# Patient Record
Sex: MALE | Race: WHITE
[De-identification: names, ages, dates, MRNs, and addresses within clinical notes are randomized per-mention and may not be internally consistent; named-entity substitution may affect disease eponyms.]

---

## 2020-10-27 LAB
ALBUMIN SERPL BCP-MCNC: 3.8 G/DL (ref 3.4–5)
ALBUMIN/GLOB SERPL: 1.1 {RATIO} (ref 1.1–1.5)
ALP SERPL-CCNC: 83 IU/L (ref 46–116)
ALT SERPL W P-5'-P-CCNC: 33 U/L (ref 30–65)
ANION GAP SERPL CALCULATED.3IONS-SCNC: 9 MMOL/L (ref 8–16)
AST SERPL W P-5'-P-CCNC: 11 U/L (ref 10–37)
BASOPHILS # BLD AUTO: 0 X10'3 (ref 0–0.2)
BASOPHILS NFR BLD AUTO: 0.6 % (ref 0–1)
BILIRUB SERPL-MCNC: 0.7 MG/DL (ref 0–1)
BUN SERPL-MCNC: 12 MG/DL (ref 7–18)
BUN/CREAT SERPL: 16 (ref 5.4–32)
CALCIUM SERPL-MCNC: 8.7 MG/DL (ref 8.5–10.1)
CHLORIDE SERPL-SCNC: 103 MMOL/L (ref 99–107)
CO2 SERPL-SCNC: 26.6 MMOL/L (ref 24–32)
CREAT SERPL-MCNC: 0.75 MG/DL (ref 0.6–1.1)
EOSINOPHIL # BLD AUTO: 0.1 X10'3 (ref 0–0.9)
EOSINOPHIL NFR BLD AUTO: 1.2 % (ref 0–6)
ERYTHROCYTE [DISTWIDTH] IN BLOOD BY AUTOMATED COUNT: 13.5 % (ref 11.5–14.5)
GFR SERPL CREATININE-BSD FRML MDRD: > 90 ML/MIN
GLUCOSE SERPL-MCNC: 155 MG/DL (ref 70–104)
HCT VFR BLD AUTO: 46.3 % (ref 42–52)
HGB BLD-MCNC: 15.6 G/DL (ref 14–17.9)
LYMPHOCYTES # BLD AUTO: 1.8 X10'3 (ref 1.1–4.8)
LYMPHOCYTES NFR BLD AUTO: 27.6 % (ref 21–51)
MCH RBC QN AUTO: 31.5 PG (ref 27–31)
MCHC RBC AUTO-ENTMCNC: 33.8 G/DL (ref 33–36.5)
MCV RBC AUTO: 93.3 FL (ref 78–98)
MONOCYTES # BLD AUTO: 0.5 X10'3 (ref 0–0.9)
MONOCYTES NFR BLD AUTO: 7.6 % (ref 2–12)
NEUTROPHILS # BLD AUTO: 4.2 X10'3 (ref 1.8–7.7)
NEUTROPHILS NFR BLD AUTO: 63 % (ref 42–75)
PLATELET # BLD AUTO: 156 X10'3 (ref 140–440)
PMV BLD AUTO: 9.3 FL (ref 7.4–10.4)
POTASSIUM SERPL-SCNC: 3.8 MMOL/L (ref 3.4–5.1)
PROT SERPL-MCNC: 7.3 G/DL (ref 6.4–8.2)
RBC # BLD AUTO: 4.96 X10'6 (ref 4.7–6.1)
SODIUM SERPL-SCNC: 139 MMOL/L (ref 135–145)

## 2020-11-03 ENCOUNTER — HOSPITAL ENCOUNTER (OUTPATIENT)
Dept: HOSPITAL 94 - PAS | Age: 65
Discharge: HOME | End: 2020-11-03
Attending: SURGERY
Payer: COMMERCIAL

## 2020-11-03 VITALS — HEIGHT: 68 IN | WEIGHT: 200 LBS | BODY MASS INDEX: 30.31 KG/M2

## 2020-11-03 VITALS — DIASTOLIC BLOOD PRESSURE: 82 MMHG | SYSTOLIC BLOOD PRESSURE: 121 MMHG

## 2020-11-03 VITALS — SYSTOLIC BLOOD PRESSURE: 120 MMHG | DIASTOLIC BLOOD PRESSURE: 82 MMHG

## 2020-11-03 VITALS — SYSTOLIC BLOOD PRESSURE: 143 MMHG | DIASTOLIC BLOOD PRESSURE: 91 MMHG

## 2020-11-03 VITALS — DIASTOLIC BLOOD PRESSURE: 87 MMHG | SYSTOLIC BLOOD PRESSURE: 143 MMHG

## 2020-11-03 VITALS — DIASTOLIC BLOOD PRESSURE: 92 MMHG | SYSTOLIC BLOOD PRESSURE: 147 MMHG

## 2020-11-03 VITALS — SYSTOLIC BLOOD PRESSURE: 136 MMHG | DIASTOLIC BLOOD PRESSURE: 88 MMHG

## 2020-11-03 VITALS — SYSTOLIC BLOOD PRESSURE: 124 MMHG | DIASTOLIC BLOOD PRESSURE: 84 MMHG

## 2020-11-03 VITALS — SYSTOLIC BLOOD PRESSURE: 137 MMHG | DIASTOLIC BLOOD PRESSURE: 84 MMHG

## 2020-11-03 VITALS — DIASTOLIC BLOOD PRESSURE: 90 MMHG | SYSTOLIC BLOOD PRESSURE: 140 MMHG

## 2020-11-03 VITALS — DIASTOLIC BLOOD PRESSURE: 89 MMHG | SYSTOLIC BLOOD PRESSURE: 139 MMHG

## 2020-11-03 VITALS — SYSTOLIC BLOOD PRESSURE: 146 MMHG | DIASTOLIC BLOOD PRESSURE: 81 MMHG

## 2020-11-03 DIAGNOSIS — Z79.899: ICD-10-CM

## 2020-11-03 DIAGNOSIS — E03.9: ICD-10-CM

## 2020-11-03 DIAGNOSIS — K80.18: Primary | ICD-10-CM

## 2020-11-03 DIAGNOSIS — Z91.018: ICD-10-CM

## 2020-11-03 DIAGNOSIS — E66.9: ICD-10-CM

## 2020-11-03 DIAGNOSIS — Z20.828: ICD-10-CM

## 2020-11-03 DIAGNOSIS — Z82.49: ICD-10-CM

## 2020-11-03 DIAGNOSIS — I10: ICD-10-CM

## 2020-11-03 DIAGNOSIS — Z83.49: ICD-10-CM

## 2020-11-03 DIAGNOSIS — E78.00: ICD-10-CM

## 2020-11-03 PROCEDURE — 80053 COMPREHEN METABOLIC PANEL: CPT

## 2020-11-03 PROCEDURE — 82948 REAGENT STRIP/BLOOD GLUCOSE: CPT

## 2020-11-03 PROCEDURE — 36415 COLL VENOUS BLD VENIPUNCTURE: CPT

## 2020-11-03 PROCEDURE — 47563 LAPARO CHOLECYSTECTOMY/GRAPH: CPT

## 2020-11-03 PROCEDURE — 87635 SARS-COV-2 COVID-19 AMP PRB: CPT

## 2020-11-03 PROCEDURE — 85025 COMPLETE CBC W/AUTO DIFF WBC: CPT

## 2020-11-03 PROCEDURE — 93005 ELECTROCARDIOGRAM TRACING: CPT

## 2020-11-03 NOTE — NUR
Received from OR via [facility], accompanied by Anesthesiologist DR CAGLE and report given by 
Anesthesiologist. PT DROWSY, NO S/S OF DISTRESS/DISCOMFORT. ABDOMEN W/2 LAP SITES W/BANDAIDS 
CDI.

-------------------------------------------------------------------------------

Addendum: 11/03/20 at 0911 by Abida Singh RN

-------------------------------------------------------------------------------

Amended: Links added.

## 2020-11-03 NOTE — NUR
PT UP AND ABLE TO AMBULATE SAFELY, PTS PAIN IS TOLERABLE, D/C INSTRUCTIONS GIVEN AND GONE 
OVER W/PT AND PTS WIFE WHO BOTH VERBALIZED UNDERSTANDING, PT D/CD TO HOME VIA W/C TO PRIVATE 
VEHICLE W/O INCIDENT.

-------------------------------------------------------------------------------

Addendum: 11/03/20 at 1056 by Abida Singh RN

-------------------------------------------------------------------------------

Amended: Links added.